# Patient Record
Sex: FEMALE | Race: WHITE | NOT HISPANIC OR LATINO | URBAN - METROPOLITAN AREA
[De-identification: names, ages, dates, MRNs, and addresses within clinical notes are randomized per-mention and may not be internally consistent; named-entity substitution may affect disease eponyms.]

---

## 2022-06-13 ENCOUNTER — LAB OUTSIDE AN ENCOUNTER (OUTPATIENT)
Dept: URBAN - METROPOLITAN AREA CLINIC 48 | Facility: CLINIC | Age: 77
End: 2022-06-13

## 2022-06-13 ENCOUNTER — DASHBOARD ENCOUNTERS (OUTPATIENT)
Age: 77
End: 2022-06-13

## 2022-06-13 ENCOUNTER — OFFICE VISIT (OUTPATIENT)
Dept: URBAN - METROPOLITAN AREA CLINIC 48 | Facility: CLINIC | Age: 77
End: 2022-06-13
Payer: MEDICARE

## 2022-06-13 VITALS
HEIGHT: 64 IN | OXYGEN SATURATION: 95 % | TEMPERATURE: 97.7 F | BODY MASS INDEX: 25.03 KG/M2 | HEART RATE: 71 BPM | SYSTOLIC BLOOD PRESSURE: 129 MMHG | WEIGHT: 146.6 LBS | DIASTOLIC BLOOD PRESSURE: 63 MMHG

## 2022-06-13 DIAGNOSIS — Z12.11 COLON CANCER SCREENING: ICD-10-CM

## 2022-06-13 DIAGNOSIS — Z86.010 PERSONAL HISTORY OF COLONIC POLYPS: ICD-10-CM

## 2022-06-13 PROBLEM — 428283002: Status: ACTIVE | Noted: 2022-06-13

## 2022-06-13 PROCEDURE — 99203 OFFICE O/P NEW LOW 30 MIN: CPT | Performed by: INTERNAL MEDICINE

## 2022-06-13 RX ORDER — FINERENONE 10 MG/1
TABLET, FILM COATED ORAL
Qty: 30 TABLET | Status: ACTIVE | COMMUNITY

## 2022-06-13 RX ORDER — ALENDRONATE SODIUM 70 MG/1
1 TABLET 30 MINUTES BEFORE THE FIRST FOOD, BEVERAGE OR MEDICINE OF THE DAY WITH PLAIN WATER TABLET ORAL
Status: ACTIVE | COMMUNITY

## 2022-06-13 RX ORDER — HUMAN INSULIN 100 [IU]/ML
15 UNITS IN THE MORNING AND 35 UNITS IN THE EVENING SUBCUTANEOUS TWICE A DAY 20 DAYS INJECTION, SUSPENSION SUBCUTANEOUS
Qty: 10 MILLILITER | Refills: 2 | Status: ACTIVE | COMMUNITY

## 2022-06-13 RX ORDER — ACETAMINOPHEN 650 MG
2 TABLETS AS NEEDED TABLET, EXTENDED RELEASE ORAL
Status: ACTIVE | COMMUNITY

## 2022-06-13 RX ORDER — L.ACID,FERM,PLA,RHA/B.BIF,LONG 126 MG
AS DIRECTED TABLET, DELAYED AND EXTENDED RELEASE ORAL
Status: ACTIVE | COMMUNITY

## 2022-06-13 RX ORDER — GABAPENTIN 300 MG/1
1 CAPSULE CAPSULE ORAL
Status: ACTIVE | COMMUNITY

## 2022-06-13 RX ORDER — GLIPIZIDE 10 MG/1
1 TABLET 30 MINUTES BEFORE BREAKFAST TABLET ORAL ONCE A DAY
Status: ACTIVE | COMMUNITY

## 2022-06-13 RX ORDER — LOSARTAN POTASSIUM 50 MG/1
TABLET, FILM COATED ORAL
Qty: 90 TABLET | Status: ACTIVE | COMMUNITY

## 2022-06-13 RX ORDER — AMLODIPINE BESYLATE 10 MG/1
1 TABLET TABLET ORAL ONCE A DAY
Status: ACTIVE | COMMUNITY

## 2022-06-13 NOTE — HPI-TODAY'S VISIT:
77 year old female presents for colon screening. Last colon was in 2009 and she was told she had diverticulosis and colon polyps but had a 10 year recall (we do not have report). Bowel movements are daily of formed, non-bloody stool but at times can be urgent. S/p lap jeremy in 2014.  Her weight has been steady. She has RLQ pain sometimes post prandially that is relieved after a bowel movement. Her daughter has FAP s/p chemo and radiation. Her grandson has FAP. The patient does not know of GI disease in the family otherwise and she tested negative for FAP. The patient was referred by Nabila Pierson NP and a copy of this document is being forwarded to the referring provider.

## 2022-06-20 ENCOUNTER — TELEPHONE ENCOUNTER (OUTPATIENT)
Dept: URBAN - METROPOLITAN AREA CLINIC 46 | Facility: CLINIC | Age: 77
End: 2022-06-20

## 2022-07-05 ENCOUNTER — OFFICE VISIT (OUTPATIENT)
Dept: URBAN - METROPOLITAN AREA SURGERY CENTER 28 | Facility: SURGERY CENTER | Age: 77
End: 2022-07-05
Payer: MEDICARE

## 2022-07-05 DIAGNOSIS — K64.2 BLEEDING GRADE III HEMORRHOIDS: ICD-10-CM

## 2022-07-05 DIAGNOSIS — K63.5 BENIGN COLON POLYP: ICD-10-CM

## 2022-07-05 DIAGNOSIS — Z86.010 ADENOMAS PERSONAL HISTORY OF COLONIC POLYPS: ICD-10-CM

## 2022-07-05 DIAGNOSIS — K57.30 ACQUIRED DIVERTICULOSIS OF COLON: ICD-10-CM

## 2022-07-05 PROCEDURE — G8907 PT DOC NO EVENTS ON DISCHARG: HCPCS | Performed by: INTERNAL MEDICINE

## 2022-07-05 PROCEDURE — 45385 COLONOSCOPY W/LESION REMOVAL: CPT | Performed by: INTERNAL MEDICINE

## 2022-07-05 PROCEDURE — 46221 LIGATION OF HEMORRHOID(S): CPT | Performed by: INTERNAL MEDICINE
